# Patient Record
Sex: MALE | Race: WHITE | Employment: OTHER | URBAN - METROPOLITAN AREA
[De-identification: names, ages, dates, MRNs, and addresses within clinical notes are randomized per-mention and may not be internally consistent; named-entity substitution may affect disease eponyms.]

---

## 2017-01-01 ENCOUNTER — HOSPITAL ENCOUNTER (EMERGENCY)
Age: 72
Discharge: HOME | End: 2017-01-01
Attending: EMERGENCY MEDICINE

## 2017-01-01 ENCOUNTER — APPOINTMENT (OUTPATIENT)
Dept: GENERAL RADIOLOGY | Age: 72
End: 2017-01-01
Attending: EMERGENCY MEDICINE

## 2017-01-01 VITALS
OXYGEN SATURATION: 100 % | WEIGHT: 180 LBS | RESPIRATION RATE: 16 BRPM | TEMPERATURE: 98 F | BODY MASS INDEX: 24.38 KG/M2 | DIASTOLIC BLOOD PRESSURE: 68 MMHG | SYSTOLIC BLOOD PRESSURE: 120 MMHG | HEART RATE: 72 BPM | HEIGHT: 72 IN

## 2017-01-01 DIAGNOSIS — S63.501A WRIST SPRAIN, RIGHT, INITIAL ENCOUNTER: Primary | ICD-10-CM

## 2017-01-01 PROCEDURE — 73110 X-RAY EXAM OF WRIST: CPT

## 2017-01-01 PROCEDURE — 99283 EMERGENCY DEPT VISIT LOW MDM: CPT

## 2017-01-01 RX ORDER — SIMVASTATIN 40 MG
40 TABLET ORAL NIGHTLY
COMMUNITY

## 2017-01-01 RX ORDER — METOPROLOL TARTRATE 50 MG/1
25 TABLET, FILM COATED ORAL 2 TIMES DAILY
COMMUNITY

## 2017-01-01 RX ORDER — HYDROCHLOROTHIAZIDE 25 MG/1
25 TABLET ORAL DAILY
COMMUNITY
End: 2019-01-10 | Stop reason: ALTCHOICE

## 2017-01-01 RX ORDER — BENAZEPRIL HYDROCHLORIDE 40 MG/1
40 TABLET, FILM COATED ORAL DAILY
COMMUNITY
End: 2019-01-10 | Stop reason: ALTCHOICE

## 2017-01-01 RX ORDER — CLOPIDOGREL BISULFATE 75 MG/1
75 TABLET ORAL DAILY
COMMUNITY
End: 2019-01-10

## 2017-01-01 NOTE — ED PROVIDER NOTES
Patient Seen in: [facility] In [place]    History   Patient presents with:  Upper Extremity Injury (musculoskeletal)    Stated Complaint: right wrist injury    HPI    47-year-old male with a history of hypertension, hyperlipidemia pre Vitals   BP 01/01/17 0915 123/66 mmHg   Pulse 01/01/17 0915 81   Resp 01/01/17 0915 18   Temp 01/01/17 0915 98.2 °F (36.8 °C)   Temp src 01/01/17 0915 Oral   SpO2 01/01/17 0915 100 %   O2 Device 01/01/17 0915 None (Room air)       Current:/66 mmHg  P tissue swelling all around the wrist. Chronic vascular calcifications. 1/1/2017  CONCLUSION:  Osteopenia. No definite acute fracture.  Widening of the scapholunate interval, with suspicion for injury to the scapholunate ligament, chronicity uncertain,

## 2019-01-10 ENCOUNTER — OFFICE VISIT (OUTPATIENT)
Dept: NEUROLOGY | Facility: CLINIC | Age: 74
End: 2019-01-10
Payer: MEDICARE

## 2019-01-10 VITALS
RESPIRATION RATE: 16 BRPM | HEART RATE: 64 BPM | BODY MASS INDEX: 25 KG/M2 | SYSTOLIC BLOOD PRESSURE: 130 MMHG | WEIGHT: 184 LBS | DIASTOLIC BLOOD PRESSURE: 60 MMHG

## 2019-01-10 DIAGNOSIS — I61.9 NONTRAUMATIC INTRACEREBRAL HEMORRHAGE, UNSPECIFIED CEREBRAL LOCATION, UNSPECIFIED LATERALITY (HCC): Primary | ICD-10-CM

## 2019-01-10 PROCEDURE — 99204 OFFICE O/P NEW MOD 45 MIN: CPT | Performed by: OTHER

## 2019-01-10 RX ORDER — LISINOPRIL 40 MG/1
TABLET ORAL DAILY
COMMUNITY
Start: 2018-12-22

## 2019-01-10 NOTE — PROGRESS NOTES
[facility] OUTPATIENT NEUROLOGY CONSULTATION    Date of consult: 1/10/2019    CC: [facility]    HPI: Hemalatha Day is a 74year old male with past medical history as listed below presents here for initial evaluation, no records available to review.  In early December p Lungs: clear to auscultation   CV: S1, S2+  Abdomen: soft, non tender, no masses  Extremities: no edema  Carotid bruits: no  Neurological Examination:  Mental status: A & O X 3  Language: Fluency with normal naming and repetition, comprehension normal  S

## 2019-01-15 ENCOUNTER — TELEPHONE (OUTPATIENT)
Dept: NEUROLOGY | Facility: CLINIC | Age: 74
End: 2019-01-15

## 2019-01-15 NOTE — TELEPHONE ENCOUNTER
RACHEL faxed to [facility] at fax number [phone]; confirmation received. Signed RACHEL sent to Scanning Department.

## 2019-01-17 NOTE — TELEPHONE ENCOUNTER
Per hospital, records are over 100 pages; asking if records could be mailed instead of faxed. Advised caller to mail records.

## 2019-01-28 NOTE — TELEPHONE ENCOUNTER
Medical records received via mail and placed in 's in-tray in [address] [place] for review.

## 2019-02-11 ENCOUNTER — TELEPHONE (OUTPATIENT)
Dept: NEUROLOGY | Facility: CLINIC | Age: 74
End: 2019-02-11

## (undated) NOTE — ED AVS SNAPSHOT
THE [facility] Emergency Department in [address] [facility]    Phone:  [phone]    Fax:  [phone]           Steven Calvert   MRN: [id number]    Department:  THE [facility] Emergency Department in [place]   Date of Visit: IF THERE IS ANY CHANGE OR WORSENING OF YOUR CONDITION, CALL YOUR PRIMARY CARE PHYSICIAN AT ONCE OR RETURN IMMEDIATELY TO THE EMERGENCY DEPARTMENT.     If you have been prescribed any medication(s), please fill your prescription right away and begin taking t

## (undated) NOTE — ED AVS SNAPSHOT
THE [facility] Emergency Department in [address] [facility]    Phone:  [phone]    Fax:  [phone]           Ruthy Alfarooscar   MRN: [id number]    Department:  THE [facility] Emergency Department in [place]   Date of Visit: receive this, we would really appreciate it if you could take the time to complete it. Thank you! You were examined and treated today on an urgent basis only. This was not a substitute for ongoing medical care.  Often, one Emergency Department visit d Antony De Leon [address] ([address]) [address]  Gene (Nadeen Hardy) [phone] [facility] [phone] [address] [facility]. ([address]) [phone] on right wrist. He is having continued pain to right medial wrist. There is swelling noted to right wrist.       FINDINGS:     Widening of the scapholunate interval, measuring 6.4 mm, which will include some radiographic magnification.  At the [facility]

## (undated) NOTE — LETTER
02/11/19          May Tomlinson [address] [facility] [address]      Dear Bella Arellano,     We are contacting you from Dr. Jessica Martin office. Your health is important to us.  We have not received test results for additional tests that you